# Patient Record
Sex: MALE | Race: WHITE | Employment: OTHER | URBAN - METROPOLITAN AREA
[De-identification: names, ages, dates, MRNs, and addresses within clinical notes are randomized per-mention and may not be internally consistent; named-entity substitution may affect disease eponyms.]

---

## 2020-01-28 ENCOUNTER — TELEPHONE (OUTPATIENT)
Dept: TRANSPLANT | Facility: CLINIC | Age: 60
End: 2020-01-28

## 2020-01-28 ENCOUNTER — MEDICAL CORRESPONDENCE (OUTPATIENT)
Dept: HEALTH INFORMATION MANAGEMENT | Facility: CLINIC | Age: 60
End: 2020-01-28

## 2020-01-28 NOTE — TELEPHONE ENCOUNTER
"[facility] BREEZE  [id number]      LIVING KIDNEY DONOR EVALUATION  Donor First Name Gerardo Donor MRALEXANDR    Donor Last Name Ashish Completed 2020 9:11 PM    1960 Record ID [id number]   BREEZE Screen PASSED     Intended Recipient  Recipient First Name Peng Recipient MRALEXANDR    Recipient Last Name Prince Relationship Cousin   Recipient  1969 Recipient Diagnosis    Recipient's ABO      Donor Information  Age 59 Gender Male   Ht 173 cm (5' 8'') Race    Wt 81.6 kg (180 lbs) Ethnicity Not /   BMI 27.40 kg/m  Preferred Language English      Required No     Blood Type O   Demographics  Home Address [address] [place] [address] # [phone]   [facility] Type [place]   State [facility] #    Zip Code [address] Type    Country [place] Preferred Contact day Mon, Fri, Thur, Wed, Tue   Email [email or web address] Preferred Contact time 11:00 AM-1:00 PM, 09:00 AM-11:00 AM   &&   Donor's Medical Information  Medical History Allergic Rhinitis   Erectile Dysfunction   Stress Tests, Normal    other (\"occasionally when having chest cold\") Medications Albuterol Inhaler   Fluticasone Nasal Spray   Sildenafil   Tadalafil   Surgical History None Reported Allergies NKDA   Social History EtOH: Occasional (1-2 drinks/week)   Illicit Drug Use: Denies   Tobacco: Denies Self-Reported Functional Status \"I am able to participate in strenuous sports such as swimming, singles tennis, football, basketball, or skiing\"   Family Medical History Cancer (denies)   Diabetes (denies)   Heart Disease (denies)   Hypertension (denies)   Kidney Disease (denies)   Kidney Stones (denies) Exercise Frequency Exercise (>3 times per week)   Review of Organ Systems  Review of Systems Airway or Lungs: No   Blood Disorder: No   Cancer: No   Diabetes,Thyroid,Adrenal,Endocrine Disorder: No   Digestive or Liver: No   Heart or Circulatory System: No   Immune Diseases: No   Kidneys and Bladder: No   Male Health: " Yes   Muscles,Bones,Joints: No   Neuro: No   Psych: No   &&   Donor's Social Information  Marital Status  Living Accommodation Owns own home/apartment   Level of Education Graduate or professional degree complete Living Arrangement With spouse   Employment Status Unemployed Concerns: health and life insurance No   Employer  Concerns: job security and lost income No   Occupation      Medical Insurance Status Has medical insurance     High Risk Behavior  High Risk Behaviors Blood transfusion < 12 months. (NO)   Commercial sex < 12 months. (NO)   Illicit IV drug use < 5yrs. (NO)   Male:male sexual contact < 5yrs. (NO)   Other high risk sexual contact < 12 months. (NO)   Reason for Donation  Referral Tx Candidate Reason for Donation Family member needs help   Permission to Disclose Inquiry Yes Patient Comments    Donor Motivation Level Highly motivated donor     PCP Contact  PCP Name [facility] Dr. Sczublewski   PCP [facility]   PCP Phone [phone]   Emergency Contact  First Name Alisia First Name    Last Name Ashish Last Name    Phone # [phone] Phone #    Phone Type Mobile Phone Type    Relationship Spouse Relationship    Office Use  Reviewed By    Reviewed 1/28/2020 11:40 AM   Admin Folder Archive   Comments    Lost for Followup    Extended Comments    BREEZE ID [email or web address]:[id number] survey status completed   Activity History  Call  Task    Due Date 1/27/2020   Last Modified Date/Time 1/27/2020 10:35 AM   Comments

## 2020-01-30 DIAGNOSIS — Z00.5 TRANSPLANT DONOR EVALUATION: Primary | ICD-10-CM

## 2020-02-05 ENCOUNTER — ALLIED HEALTH/NURSE VISIT (OUTPATIENT)
Dept: NURSING | Facility: CLINIC | Age: 60
End: 2020-02-05
Payer: COMMERCIAL

## 2020-02-05 VITALS
SYSTOLIC BLOOD PRESSURE: 116 MMHG | BODY MASS INDEX: 27.93 KG/M2 | WEIGHT: 184.3 LBS | DIASTOLIC BLOOD PRESSURE: 80 MMHG | HEIGHT: 68 IN

## 2020-02-05 DIAGNOSIS — Z00.5 TRANSPLANT DONOR EVALUATION: ICD-10-CM

## 2020-02-05 DIAGNOSIS — Z52.4 DONOR OF KIDNEY FOR TRANSPLANT: Primary | ICD-10-CM

## 2020-02-05 LAB
ABO + RH BLD: NORMAL
ABO + RH BLD: NORMAL
ALBUMIN UR-MCNC: NEGATIVE MG/DL
APPEARANCE UR: CLEAR
BILIRUB UR QL STRIP: NEGATIVE
COLOR UR AUTO: YELLOW
CREAT SERPL-MCNC: 1.45 MG/DL (ref 0.66–1.25)
CREAT UR-MCNC: 213 MG/DL
GFR SERPL CREATININE-BSD FRML MDRD: 53 ML/MIN/{1.73_M2}
GLUCOSE SERPL-MCNC: 96 MG/DL (ref 70–99)
GLUCOSE UR STRIP-MCNC: NEGATIVE MG/DL
HGB BLD-MCNC: 14.8 G/DL (ref 13.3–17.7)
HGB UR QL STRIP: NEGATIVE
KETONES UR STRIP-MCNC: NEGATIVE MG/DL
LEUKOCYTE ESTERASE UR QL STRIP: NEGATIVE
MICROALBUMIN UR-MCNC: 7 MG/L
MICROALBUMIN/CREAT UR: 3.09 MG/G CR (ref 0–17)
NITRATE UR QL: NEGATIVE
PH UR STRIP: 7 PH (ref 5–7)
PROT UR-MCNC: 0.15 G/L
PROT/CREAT 24H UR: 0.07 G/G CR (ref 0–0.2)
SOURCE: NORMAL
SP GR UR STRIP: 1.02 (ref 1–1.03)
SPECIMEN EXP DATE BLD: NORMAL
UROBILINOGEN UR STRIP-ACNC: 0.2 EU/DL (ref 0.2–1)

## 2020-02-05 PROCEDURE — 81003 URINALYSIS AUTO W/O SCOPE: CPT | Performed by: TRANSPLANT SURGERY

## 2020-02-05 PROCEDURE — 36415 COLL VENOUS BLD VENIPUNCTURE: CPT | Performed by: TRANSPLANT SURGERY

## 2020-02-05 PROCEDURE — 82565 ASSAY OF CREATININE: CPT | Performed by: TRANSPLANT SURGERY

## 2020-02-05 PROCEDURE — 82043 UR ALBUMIN QUANTITATIVE: CPT | Performed by: TRANSPLANT SURGERY

## 2020-02-05 PROCEDURE — 86900 BLOOD TYPING SEROLOGIC ABO: CPT | Performed by: TRANSPLANT SURGERY

## 2020-02-05 PROCEDURE — 82947 ASSAY GLUCOSE BLOOD QUANT: CPT | Performed by: TRANSPLANT SURGERY

## 2020-02-05 PROCEDURE — 84156 ASSAY OF PROTEIN URINE: CPT | Performed by: TRANSPLANT SURGERY

## 2020-02-05 PROCEDURE — 85018 HEMOGLOBIN: CPT | Performed by: TRANSPLANT SURGERY

## 2020-02-05 PROCEDURE — 99207 ZZC NO CHARGE NURSE ONLY: CPT

## 2020-02-05 ASSESSMENT — MIFFLIN-ST. JEOR: SCORE: 1635.48

## 2020-02-05 NOTE — NURSING NOTE
I recorded a height, weight and three blood pressures 15 minutes apart.  I validated with the patient they have already had their lab appointment, have one today or one in the future.     Antonia Saucedo RN

## 2020-02-07 ENCOUNTER — TELEPHONE (OUTPATIENT)
Dept: TRANSPLANT | Facility: CLINIC | Age: 60
End: 2020-02-07

## 2020-02-07 DIAGNOSIS — Z00.5 TRANSPLANT DONOR EVALUATION: Primary | ICD-10-CM

## 2020-02-07 NOTE — TELEPHONE ENCOUNTER
Informed Gerardo of lab results. Cr=1.45--est GFR=53--average GFR=59. Admitted to taking body building supplements.Will hold off taking them for 3 weeks and will repeat Cr.

## 2020-03-03 DIAGNOSIS — Z00.5 TRANSPLANT DONOR EVALUATION: ICD-10-CM

## 2020-03-03 LAB
CREAT SERPL-MCNC: 1.36 MG/DL (ref 0.66–1.25)
GFR SERPL CREATININE-BSD FRML MDRD: 57 ML/MIN/{1.73_M2}

## 2020-03-03 PROCEDURE — 82565 ASSAY OF CREATININE: CPT | Performed by: TRANSPLANT SURGERY

## 2020-03-03 PROCEDURE — 36415 COLL VENOUS BLD VENIPUNCTURE: CPT | Performed by: TRANSPLANT SURGERY

## 2022-09-01 ENCOUNTER — TELEPHONE (OUTPATIENT)
Dept: TRANSPLANT | Facility: CLINIC | Age: 62
End: 2022-09-01

## 2022-09-01 NOTE — TELEPHONE ENCOUNTER
Writer calling Gerardo to see if he is interested in being worked up as a living donor. Gerardo would like to proceed. Let him know we would get him set up with an JOHN call then referral call and most likely phase 1 testing. Gerardo verbalized understanding and is in agreement with plan. Gerardo states reaching out by email to schedule the calls would be best.

## 2022-09-08 ENCOUNTER — TELEPHONE (OUTPATIENT)
Dept: TRANSPLANT | Facility: CLINIC | Age: 62
End: 2022-09-08

## 2022-09-09 ENCOUNTER — DOCUMENTATION ONLY (OUTPATIENT)
Dept: TRANSPLANT | Facility: CLINIC | Age: 62
End: 2022-09-09

## 2022-09-09 ENCOUNTER — TELEPHONE (OUTPATIENT)
Dept: TRANSPLANT | Facility: CLINIC | Age: 62
End: 2022-09-09

## 2022-09-09 NOTE — TELEPHONE ENCOUNTER
Initial Independent Living Donor Advocate contact made with potential donor today.  I introduced myself and my role during the donation process, includin.  JOHN ROLE   The federal government requires that all licensed transplant centers provide the living donor with an Independent Living Donor Advocate (JOHN).  I do not meet recipients or attend meetings that discuss their care or decision to transplant them. My role is separate to avoid any conflict of interest.  My role is to ensure:  1) your rights are protected;  2) you get all the information you need from the transplant team to make a fully informed decision whether to donate;   3) that living donation is in your best interest.   4) that you have the right to decide NOT to go forward with living donation at any time during this process.  I am available to you throughout the workup, during surgery phase and follow-up at home.   2. WORKUP & PRIVACY     Your identity and workup are not shared with the recipient at any time.     There is a medical donor workup that consists of testing to determine if you are healthy enough to donate.  Workup tests include many blood draws, urine collection/ (kidney function testing), chest x-ray, EKG, CT scan. As you complete each step then you may move on to the next.  Workup can take as little or as long as you need and you can stop the process at any time.     Transplant is a treatment option, not a cure. A kidney from a living kidney donor can last 12-14 years.  Other treatment options are  donation and two types of dialysis.     This is major surgery and your estimated hospital stay is approximately 1-2 nights.  After surgery, there are driving and lifting restrictions - no driving for two weeks and no lifting over ten pounds for 8 weeks.  Donors are routinely off from work for 4 - 6 weeks after surgery, and potentially longer if they have a physical job.       If you anticipate lost wages due to donation, donor  wage reimbursement options may be available to you and will be reviewed with you during the evaluation process.      The recipient's insurance covers the medical expenses related to the donor evaluation and surgery.  However, it is important for you to carry your own health insurance to address any medical issues that are found and are NOT related to living donation.  3.  QUESTIONS  Have you received a packet from the transplant department?     Questions?    Have you discussed with anyone your potential decision to donate?   Yes.  Is anyone pressuring or coercing you to donate? No.  Have you discussed any financial arrangements with recipient around donating a kidney? Yes.  Are you aware that you can confidentially opt out at any time, up to and including day of donation? Yes.  At this time, would you like to proceed with the medical evaluation to see if you can be a kidney donor?  Yes.    If yes, the donor coordinator will be reaching out to you with next steps.     You can reach me or someone else on the JOHN team by calling [phone] Option 3.    JOHN NOTES: Gerardo wants to donate to his cousin.  He is retired.  He has two teenagers at home and one adult son who lives in a group home.  One of his daughters was in a serious car accident, and she is still doing a lot of therapy.  He would like to donate in January 2023.  Shelley Shahid RN is his donor nurse coordinator, and he is scheduled to talk to her on Tuesday, Sept. 13 at 1:00 PM (this is outside of your blocked times, since Gerardo is booked every morning taking his daughter to her physical therapy appointments.)    Duration of call 30 minutes

## 2022-09-13 ENCOUNTER — TELEPHONE (OUTPATIENT)
Dept: TRANSPLANT | Facility: CLINIC | Age: 62
End: 2022-09-13

## 2022-09-13 NOTE — TELEPHONE ENCOUNTER
Contacted Gerardo Hinojosa to introduce myself and my role, review of medical/surgical/family history and next steps.     Gerardo Hinojosa  is aware He can stop donor evaluation at any time.    Have you ever been positive for COVID 19? no    Have you received the COVID vaccination series? Yes     Reviewed risk of COVID-19 to living donors.    Regular blood donor? Yes Last blood donation date two years ago (Notified donor to avoid blood donation at this time to get accurate blood counts if going through evaluation)     Gerardo Hinojosa is a 62 year old male  ABO O that would like to learn more about kidney donation to his cousin.     Concerns from medical/surgical/family history: Not currently on any daily medications or diagnosis. Colonoscopy is due now.     Current medications and NSAID use: None    Reviewed any history of travel in endemic areas: [place], [place], [place]  Strongyloides- [place], [place] and [place].  Trypanosoma cruzi ([place])- [place]  West Nile Virus- [place], [place], [place], [place] and [place].     Per our Phase 1 algorithm, does meet criteria to do preliminary testing.     Reviewed evaluation testing: Iohexol, Lab work, CXR, EKG, Provider visits and functions, CT Angiogram.     Reviewed operations of selection committee and angio review meetings and the need for multidisciplinary input.     Reviewed NKR listing and transfer of care to D team if approved. Provided Gerardo with NKR website to review.     Briefly went over options if approved of NDD and voucher donation.     Gerardo would like to proceed with next steps: phase 1 testing     Confirmed that Gerardo reviewed Informed consent document and all questions answered.  Reviewed that they will receive Docusign to obtain electronic signature for the following: Informed consent, SRTR data, FLORENCE for medical information, Auth for Electronic communication and will need their signed consent back before proceeding with evaluation.       Encouraged sign up for MyChart and reviewed importance of watching teaching videos prior to evaluation.     Verified recipient status if not NDD.    Donor timeline: TBD     Will send orders to scheduling team to set up for phase 1 testing.

## 2022-09-14 ENCOUNTER — TRANSFERRED RECORDS (OUTPATIENT)
Dept: HEALTH INFORMATION MANAGEMENT | Facility: CLINIC | Age: 62
End: 2022-09-14

## 2022-09-14 ENCOUNTER — DOCUMENTATION ONLY (OUTPATIENT)
Dept: TRANSPLANT | Facility: CLINIC | Age: 62
End: 2022-09-14

## 2022-09-14 ENCOUNTER — MEDICAL CORRESPONDENCE (OUTPATIENT)
Dept: HEALTH INFORMATION MANAGEMENT | Facility: CLINIC | Age: 62
End: 2022-09-14

## 2022-09-14 DIAGNOSIS — Z00.5 TRANSPLANT DONOR EVALUATION: Primary | ICD-10-CM

## 2022-09-14 NOTE — PROGRESS NOTES
Sent via Email and US Mail orders for Phase 1's with instructions on scheduling appts at local clinic and fasting for labs. To contact me if ?'s.

## 2022-09-27 ENCOUNTER — TRANSFERRED RECORDS (OUTPATIENT)
Dept: HEALTH INFORMATION MANAGEMENT | Facility: CLINIC | Age: 62
End: 2022-09-27

## 2022-10-04 ENCOUNTER — TRANSFERRED RECORDS (OUTPATIENT)
Dept: HEALTH INFORMATION MANAGEMENT | Facility: CLINIC | Age: 62
End: 2022-10-04

## 2022-10-13 ENCOUNTER — TELEPHONE (OUTPATIENT)
Dept: TRANSPLANT | Facility: CLINIC | Age: 62
End: 2022-10-13

## 2022-10-13 DIAGNOSIS — Z52.4 KIDNEY DONOR: ICD-10-CM

## 2022-10-13 DIAGNOSIS — Z00.5 EXAMINATION OF POTENTIAL DONOR OF ORGAN AND TISSUE: ICD-10-CM

## 2022-10-13 NOTE — TELEPHONE ENCOUNTER
Let Gerardo know we would review the labs he had done last week. Per Dr Childress since creatinine was elevated we will have patient go for cystatin C draw. If that is normal he can come for eval.Asked Gerardo to let me know when he goes for the lab draw.

## 2022-10-13 NOTE — LETTER
PHYSICIAN ORDERS      DATE & TIME ISSUED: 2022 2:41 PM  PATIENT NAME: Gerardo Hinojosa   : 1960     [facility] MR# [if applicable]: [id number]     DIAGNOSIS:  Living donor evaluation  ICD-10 CODE: z00.5    Order for lab draw: Cystatin C with GFR    Any questions please call: Shelley at [phone]    Please fax these results to [phone]      Juan Childress MD

## 2022-10-14 ENCOUNTER — TRANSFERRED RECORDS (OUTPATIENT)
Dept: HEALTH INFORMATION MANAGEMENT | Facility: CLINIC | Age: 62
End: 2022-10-14

## 2022-10-25 DIAGNOSIS — Z00.5 TRANSPLANT DONOR EVALUATION: Primary | ICD-10-CM

## 2022-10-25 RX ORDER — EPINEPHRINE 1 MG/ML
0.3 INJECTION, SOLUTION, CONCENTRATE INTRAVENOUS EVERY 5 MIN PRN
Status: CANCELLED | OUTPATIENT
Start: 2022-11-04

## 2022-10-25 RX ORDER — DIPHENHYDRAMINE HYDROCHLORIDE 50 MG/ML
50 INJECTION INTRAMUSCULAR; INTRAVENOUS
Status: CANCELLED
Start: 2022-11-04

## 2022-10-25 RX ORDER — METHYLPREDNISOLONE SODIUM SUCCINATE 125 MG/2ML
125 INJECTION, POWDER, LYOPHILIZED, FOR SOLUTION INTRAMUSCULAR; INTRAVENOUS
Status: CANCELLED
Start: 2022-11-04

## 2022-10-25 RX ORDER — ALBUTEROL SULFATE 90 UG/1
1-2 AEROSOL, METERED RESPIRATORY (INHALATION)
Status: CANCELLED
Start: 2022-11-04

## 2022-10-25 RX ORDER — ALBUTEROL SULFATE 0.83 MG/ML
2.5 SOLUTION RESPIRATORY (INHALATION)
Status: CANCELLED | OUTPATIENT
Start: 2022-11-04

## 2022-10-25 RX ORDER — MEPERIDINE HYDROCHLORIDE 25 MG/ML
25 INJECTION INTRAMUSCULAR; INTRAVENOUS; SUBCUTANEOUS EVERY 30 MIN PRN
Status: CANCELLED | OUTPATIENT
Start: 2022-11-04

## 2022-11-03 LAB
ABO/RH(D): NORMAL
ANTIBODY SCREEN: NEGATIVE
SPECIMEN EXPIRATION DATE: NORMAL

## 2022-11-04 ENCOUNTER — ALLIED HEALTH/NURSE VISIT (OUTPATIENT)
Dept: TRANSPLANT | Facility: CLINIC | Age: 62
End: 2022-11-04
Attending: INTERNAL MEDICINE

## 2022-11-04 ENCOUNTER — DOCUMENTATION ONLY (OUTPATIENT)
Dept: TRANSPLANT | Facility: CLINIC | Age: 62
End: 2022-11-04

## 2022-11-04 ENCOUNTER — OFFICE VISIT (OUTPATIENT)
Dept: TRANSPLANT | Facility: CLINIC | Age: 62
End: 2022-11-04
Attending: INTERNAL MEDICINE

## 2022-11-04 ENCOUNTER — LAB (OUTPATIENT)
Dept: LAB | Facility: CLINIC | Age: 62
End: 2022-11-04
Attending: INTERNAL MEDICINE

## 2022-11-04 ENCOUNTER — OFFICE VISIT (OUTPATIENT)
Dept: NEPHROLOGY | Facility: CLINIC | Age: 62
End: 2022-11-04
Attending: INTERNAL MEDICINE

## 2022-11-04 ENCOUNTER — OFFICE VISIT (OUTPATIENT)
Dept: INFUSION THERAPY | Facility: CLINIC | Age: 62
End: 2022-11-04
Attending: INTERNAL MEDICINE

## 2022-11-04 VITALS
WEIGHT: 185 LBS | HEIGHT: 68 IN | SYSTOLIC BLOOD PRESSURE: 139 MMHG | DIASTOLIC BLOOD PRESSURE: 92 MMHG | OXYGEN SATURATION: 96 % | HEART RATE: 65 BPM | BODY MASS INDEX: 28.04 KG/M2

## 2022-11-04 VITALS
BODY MASS INDEX: 28.48 KG/M2 | TEMPERATURE: 97.4 F | DIASTOLIC BLOOD PRESSURE: 95 MMHG | HEART RATE: 60 BPM | OXYGEN SATURATION: 99 % | SYSTOLIC BLOOD PRESSURE: 145 MMHG | WEIGHT: 187.3 LBS | RESPIRATION RATE: 16 BRPM

## 2022-11-04 DIAGNOSIS — E78.2 MIXED HYPERLIPIDEMIA: ICD-10-CM

## 2022-11-04 DIAGNOSIS — Z00.5 TRANSPLANT DONOR EVALUATION: ICD-10-CM

## 2022-11-04 DIAGNOSIS — Z00.5 TRANSPLANT DONOR EVALUATION: Primary | ICD-10-CM

## 2022-11-04 DIAGNOSIS — R03.0 ELEVATED BLOOD PRESSURE READING: ICD-10-CM

## 2022-11-04 DIAGNOSIS — Z52.4 KIDNEY DONOR: ICD-10-CM

## 2022-11-04 DIAGNOSIS — J45.20 MILD INTERMITTENT ASTHMA WITHOUT COMPLICATION: ICD-10-CM

## 2022-11-04 DIAGNOSIS — Z52.4 KIDNEY DONOR: Primary | ICD-10-CM

## 2022-11-04 DIAGNOSIS — C44.41 BASAL CELL CARCINOMA (BCC) OF SCALP: ICD-10-CM

## 2022-11-04 PROBLEM — C44.91 BASAL CELL CARCINOMA: Status: ACTIVE | Noted: 2022-11-04

## 2022-11-04 LAB
ABO/RH(D): NORMAL
ALBUMIN MFR UR ELPH: <6 MG/DL
ALBUMIN SERPL BCG-MCNC: 4.5 G/DL (ref 3.5–5.2)
ALBUMIN UR-MCNC: NEGATIVE MG/DL
ALP SERPL-CCNC: 56 U/L (ref 40–129)
ALT SERPL W P-5'-P-CCNC: 19 U/L (ref 10–50)
ANION GAP SERPL CALCULATED.3IONS-SCNC: 9 MMOL/L (ref 7–15)
APPEARANCE UR: CLEAR
APTT PPP: 29 SECONDS (ref 22–38)
AST SERPL W P-5'-P-CCNC: 27 U/L (ref 10–50)
BILIRUB SERPL-MCNC: 0.5 MG/DL
BILIRUB UR QL STRIP: NEGATIVE
BUN SERPL-MCNC: 16.3 MG/DL (ref 8–23)
CALCIUM SERPL-MCNC: 9.7 MG/DL (ref 8.8–10.2)
CHLORIDE SERPL-SCNC: 103 MMOL/L (ref 98–107)
CHOLEST SERPL-MCNC: 185 MG/DL
CMV IGG SERPL IA-ACNC: <0.2 U/ML
CMV IGG SERPL IA-ACNC: NORMAL
COLOR UR AUTO: YELLOW
CREAT SERPL-MCNC: 1.19 MG/DL (ref 0.67–1.17)
CREAT UR-MCNC: 55.9 MG/DL
CREAT UR-MCNC: 56 MG/DL
DEPRECATED HCO3 PLAS-SCNC: 27 MMOL/L (ref 22–29)
EBV VCA IGG SER IA-ACNC: 184 U/ML
EBV VCA IGG SER IA-ACNC: POSITIVE
EBV VCA IGM SER IA-ACNC: <10 U/ML
EBV VCA IGM SER IA-ACNC: NORMAL
ERYTHROCYTE [DISTWIDTH] IN BLOOD BY AUTOMATED COUNT: 12.9 % (ref 10–15)
GFR SERPL CREATININE-BSD FRML MDRD: 70 ML/MIN/1.73M2
GLUCOSE SERPL-MCNC: 99 MG/DL (ref 70–99)
GLUCOSE UR STRIP-MCNC: NEGATIVE MG/DL
HBA1C MFR BLD: 5.6 %
HBV CORE AB SERPL QL IA: NONREACTIVE
HBV SURFACE AB SERPL IA-ACNC: 24.5 M[IU]/ML
HBV SURFACE AB SERPL IA-ACNC: REACTIVE M[IU]/ML
HBV SURFACE AG SERPL QL IA: NONREACTIVE
HCT VFR BLD AUTO: 45.6 % (ref 40–53)
HCV AB SERPL QL IA: NONREACTIVE
HDLC SERPL-MCNC: 57 MG/DL
HGB BLD-MCNC: 15.3 G/DL (ref 13.3–17.7)
HGB UR QL STRIP: NEGATIVE
HIV 1+2 AB+HIV1 P24 AG SERPL QL IA: NONREACTIVE
INR PPP: 0.99 (ref 0.85–1.15)
KETONES UR STRIP-MCNC: NEGATIVE MG/DL
LDLC SERPL CALC-MCNC: 94 MG/DL
LEUKOCYTE ESTERASE UR QL STRIP: NEGATIVE
MCH RBC QN AUTO: 29.5 PG (ref 26.5–33)
MCHC RBC AUTO-ENTMCNC: 33.6 G/DL (ref 31.5–36.5)
MCV RBC AUTO: 88 FL (ref 78–100)
MICROALBUMIN UR-MCNC: <12 MG/L
MICROALBUMIN/CREAT UR: NORMAL MG/G{CREAT}
NITRATE UR QL: NEGATIVE
NONHDLC SERPL-MCNC: 128 MG/DL
PH UR STRIP: 6.5 [PH] (ref 5–7)
PHOSPHATE SERPL-MCNC: 3.2 MG/DL (ref 2.5–4.5)
PLATELET # BLD AUTO: 213 10E3/UL (ref 150–450)
POTASSIUM SERPL-SCNC: 3.8 MMOL/L (ref 3.4–5.3)
PROT SERPL-MCNC: 7 G/DL (ref 6.4–8.3)
PROT/CREAT 24H UR: NORMAL MG/G{CREAT}
PSA SERPL-MCNC: 0.88 NG/ML (ref 0–4.5)
RBC # BLD AUTO: 5.18 10E6/UL (ref 4.4–5.9)
RBC URINE: 1 /HPF
SODIUM SERPL-SCNC: 139 MMOL/L (ref 136–145)
SP GR UR STRIP: 1.01 (ref 1–1.03)
SPECIMEN EXPIRATION DATE: NORMAL
T PALLIDUM AB SER QL: NONREACTIVE
TRIGL SERPL-MCNC: 168 MG/DL
URATE SERPL-MCNC: 5.8 MG/DL (ref 3.4–7)
UROBILINOGEN UR STRIP-MCNC: NORMAL MG/DL
WBC # BLD AUTO: 5.9 10E3/UL (ref 4–11)
WBC URINE: <1 /HPF

## 2022-11-04 PROCEDURE — 83036 HEMOGLOBIN GLYCOSYLATED A1C: CPT

## 2022-11-04 PROCEDURE — 36415 COLL VENOUS BLD VENIPUNCTURE: CPT | Performed by: SURGERY

## 2022-11-04 PROCEDURE — 86780 TREPONEMA PALLIDUM: CPT

## 2022-11-04 PROCEDURE — 86901 BLOOD TYPING SEROLOGIC RH(D): CPT

## 2022-11-04 PROCEDURE — 86665 EPSTEIN-BARR CAPSID VCA: CPT

## 2022-11-04 PROCEDURE — 99207 PR NO CHARGE COORDINATED CARE PS: CPT

## 2022-11-04 PROCEDURE — 36415 COLL VENOUS BLD VENIPUNCTURE: CPT

## 2022-11-04 PROCEDURE — 86481 TB AG RESPONSE T-CELL SUSP: CPT

## 2022-11-04 PROCEDURE — 86706 HEP B SURFACE ANTIBODY: CPT

## 2022-11-04 PROCEDURE — 86704 HEP B CORE ANTIBODY TOTAL: CPT

## 2022-11-04 PROCEDURE — 99205 OFFICE O/P NEW HI 60 MIN: CPT | Performed by: SURGERY

## 2022-11-04 PROCEDURE — 82542 COL CHROMOTOGRAPHY QUAL/QUAN: CPT | Performed by: SURGERY

## 2022-11-04 PROCEDURE — G0103 PSA SCREENING: HCPCS

## 2022-11-04 PROCEDURE — 84100 ASSAY OF PHOSPHORUS: CPT

## 2022-11-04 PROCEDURE — 86850 RBC ANTIBODY SCREEN: CPT

## 2022-11-04 PROCEDURE — 84550 ASSAY OF BLOOD/URIC ACID: CPT

## 2022-11-04 PROCEDURE — 84156 ASSAY OF PROTEIN URINE: CPT

## 2022-11-04 PROCEDURE — 81378 HLA I & II TYPING HR: CPT

## 2022-11-04 PROCEDURE — 82043 UR ALBUMIN QUANTITATIVE: CPT

## 2022-11-04 PROCEDURE — 85027 COMPLETE CBC AUTOMATED: CPT

## 2022-11-04 PROCEDURE — 80061 LIPID PANEL: CPT

## 2022-11-04 PROCEDURE — 85610 PROTHROMBIN TIME: CPT

## 2022-11-04 PROCEDURE — 80053 COMPREHEN METABOLIC PANEL: CPT

## 2022-11-04 PROCEDURE — 87340 HEPATITIS B SURFACE AG IA: CPT

## 2022-11-04 PROCEDURE — 85730 THROMBOPLASTIN TIME PARTIAL: CPT

## 2022-11-04 PROCEDURE — 86789 WEST NILE VIRUS ANTIBODY: CPT

## 2022-11-04 PROCEDURE — 86803 HEPATITIS C AB TEST: CPT

## 2022-11-04 PROCEDURE — 81001 URINALYSIS AUTO W/SCOPE: CPT

## 2022-11-04 PROCEDURE — 99205 OFFICE O/P NEW HI 60 MIN: CPT | Mod: GC | Performed by: INTERNAL MEDICINE

## 2022-11-04 PROCEDURE — 86644 CMV ANTIBODY: CPT

## 2022-11-04 PROCEDURE — 255N000002 HC RX 255 OP 636: Performed by: INTERNAL MEDICINE

## 2022-11-04 RX ORDER — DIPHENHYDRAMINE HYDROCHLORIDE 50 MG/ML
50 INJECTION INTRAMUSCULAR; INTRAVENOUS
Status: CANCELLED
Start: 2022-11-04

## 2022-11-04 RX ORDER — SILDENAFIL 100 MG/1
TABLET, FILM COATED ORAL
COMMUNITY
Start: 2022-01-03

## 2022-11-04 RX ORDER — ALBUTEROL SULFATE 90 UG/1
1-2 AEROSOL, METERED RESPIRATORY (INHALATION)
COMMUNITY
Start: 2022-10-04

## 2022-11-04 RX ORDER — ALBUTEROL SULFATE 0.83 MG/ML
2.5 SOLUTION RESPIRATORY (INHALATION)
Status: CANCELLED | OUTPATIENT
Start: 2022-11-04

## 2022-11-04 RX ORDER — METHYLPREDNISOLONE SODIUM SUCCINATE 125 MG/2ML
125 INJECTION, POWDER, LYOPHILIZED, FOR SOLUTION INTRAMUSCULAR; INTRAVENOUS
Status: CANCELLED
Start: 2022-11-04

## 2022-11-04 RX ORDER — EPINEPHRINE 1 MG/ML
0.3 INJECTION, SOLUTION INTRAMUSCULAR; SUBCUTANEOUS EVERY 5 MIN PRN
Status: CANCELLED | OUTPATIENT
Start: 2022-11-04

## 2022-11-04 RX ORDER — TADALAFIL 20 MG/1
TABLET ORAL
COMMUNITY
Start: 2022-01-03

## 2022-11-04 RX ORDER — ROSUVASTATIN CALCIUM 10 MG/1
10 TABLET, COATED ORAL DAILY
COMMUNITY
Start: 2022-10-16 | End: 2023-10-16

## 2022-11-04 RX ORDER — MEPERIDINE HYDROCHLORIDE 25 MG/ML
25 INJECTION INTRAMUSCULAR; INTRAVENOUS; SUBCUTANEOUS EVERY 30 MIN PRN
Status: CANCELLED | OUTPATIENT
Start: 2022-11-04

## 2022-11-04 RX ORDER — FLUTICASONE PROPIONATE 50 MCG
2 SPRAY, SUSPENSION (ML) NASAL
COMMUNITY
Start: 2022-10-04

## 2022-11-04 RX ORDER — FEXOFENADINE HCL AND PSEUDOEPHEDRINE HCI 60; 120 MG/1; MG/1
1 TABLET, EXTENDED RELEASE ORAL 2 TIMES DAILY
COMMUNITY
Start: 2022-10-04

## 2022-11-04 RX ORDER — ALBUTEROL SULFATE 90 UG/1
1-2 AEROSOL, METERED RESPIRATORY (INHALATION)
Status: CANCELLED
Start: 2022-11-04

## 2022-11-04 RX ADMIN — IOHEXOL 4 ML: 350 INJECTION, SOLUTION INTRAVENOUS at 08:39

## 2022-11-04 ASSESSMENT — PAIN SCALES - GENERAL: PAINLEVEL: NO PAIN (0)

## 2022-11-04 NOTE — PROGRESS NOTES
Saw Gerardo in clinic on 22 for Living Kidney Donor Evaluation.     Gerardo is interested in donation for a friend.    I provided a folder which included copies of the followin. Living Kidney Donor Evaluation Consent  2. Paired Exchange Consent  3. Donor Shield Pamphlet  4. Living Donor Collective Study information  5. Kidney for Life pamphlet  6. Kidney Donors are Heroes! Study synopsis  7. Most current SRTR data.      I also provided a parking pass and food voucher.      I reviewed the Living Kidney Donor Evaluation Consent, dated 2020 and Paired Exchange/ NDD consent dated 2018.  I answered any question.    Evaluation Notes:    We are cancelling the rest of his appointments today due to high blood pressures. He needs to follow up with PCP about Bps and lipids. Gerardo verbalized understanding and is in agreement with plan.

## 2022-11-04 NOTE — LETTER
11/4/2022       RE: Gerardo Hinojosa  [address] [facility] [address]     Dear Colleague,    Thank you for referring your patient, Gerardo Hinojosa, to the [facility] [place] at [facility]. Please see a copy of my visit note below.    TRANSPLANT NEPHROLOGY DONOR EVALUATION    Assessment and Plan:  # Prospective Kidney Transplant Donor: Patient with a couple of issues that need to be addressed prior to donation. Patient's blood pressure is elevated at this visit, kidney function appears to be acceptable with Iohexol pending, and urinalysis is bland.   -Recommend management of BP with PCP   -If plan is to move forward with family voucher donation would then recommend 24h ambulatory BP monitoring to assure BP is controlled, obtain echo and optho consult to look for end organ damage.    -Will need screening colonoscopy   -Derm clearance due to history of BCC and multiple AKs    # Hypertension:   - No prior history of blood pressure issues, but BP elevated to around 140-150s/90s in clinic today. Per patient, BP has also been slowly increasing in his more recent clinic visits.    -Recommend PCP managing BP and if patient still would like to donate would then recommend 24h ambulatory BP monitoring to assure BP is controlled, obtain echo and optho consult to look for end organ damage.       # Hyperlipidemia:    -Elevated ASCVD risk due to HLD and age. Recommend discussion with PCP regarding starting a statin    # History of BCC with multiple dysplastic nevi:   -derm clearance    # Cardiac risk:   -medium 2/2 age. Recommend echo    # Asthma:   -reviewed recent PFT results from outside records, showing mild obstructive findings and increased diffusion capacity   -has PRN albuterol inhaler at home, uses at most a few times per week    # Healthcare maintenance:   -colonoscopy: last at age 52 (per patient was normal), has a repeat scheduled in December  2022   -PSA was checked last month, was normal    Discussed the risks of donating a kidney, including the surgical risk and the possible risks of living with one kidney.    Education about expected post-donation kidney function and how chronic kidney disease (CKD) and end stage kidney disease (ESKD) might potentially impact the donor in the future, include, but not limited to:       - On average, donors will have 25-35% permanent loss of kidney function at donation.       - Baseline risk of ESKD may slightly exceed that of members of the general population with the same demographic profile.       - Donor risks must be interpreted in light of known epidemiology of both CKD or ESKD, such as that CKD generally develops in midlife (40-50 years old) and ESKD generally develops after age 60.       - Medical evaluation of young potential donors cannot predict lifetime risk of CKD or ESKD.       - Donors may be at higher risk for CKD if they sustain damage to the remaining kidney.       - Development of CKD and progression of ESKD may be more rapid with only 1 kidney.       - Some type of kidney replacement therapy, either kidney transplant or dialysis, is required when reaching ESKD.    Potential medical or surgical risks include, but not limited to:       - Death.       - Scars, pain, fatigue, and other consequences typical of any surgical procedure.       - Decreased kidney function.       - Abdominal or bowel symptoms, such as bloating and nausea, and developing bowel obstruction.       - Kidney failure (ESKD) and the need for a kidney transplant or dialysis for the donor.       - Impact of obesity, hypertension, or other donor-specific medical conditions on morbidity and mortality of the potential donor.    Patients overall evaluation will be discussed with the transplant team and a final recommendation on the patients' suitability to be a kidney transplant donor will be made at that time.    Discussed with   [place].    Luis Daniel Cole MD  PGY1, Internal Medicine  Transplant Nephrology Clinic    Physician Attestation   I, Juan Childress MD, personally examined and evaluated this patient.  I discussed the patient with the resident/fellow and care team, and agree with the assessment and plan of care as documented in the note on 11/04/22 .      I personally reviewed vital signs, medications, labs and imaging.  Juan Childress MD  Date of Service (when I saw the patient): 11/04/22          Consult:  Gerardo Hinojosa was seen in consultation at the request of Dr. Bladimir Jackson for evaluation as a potential kidney transplant donor.    Reason for Visit:   Gerardo Hinojosa is a 62 year old male who presents for a kidney donor evaluation.  Patient would like to donate to his cousin.    Present Condition and Donor-Related Medical History:    Mr. Hinojosa is a 62yM ABO O w/ PMH of BCC frontal scalp requiring Mohs surgery in 2012, dysplastic nevi for which he last saw dermatology 2/11/22, hyperlipidemia, elevated Bps presenting to be evaluated as a living donor for his cousin.     The patient overall feels well. He denies any recent hospitalizations. He denies nausea, vomiting, diarrhea, fever, chills, shortness of breath, chest pain, LE edema, unintentional weight loss, nights sweats, dysuria, hematuria.              Kidney Disease Hx:       h/o Kidney Problems: No  Family h/o Genetic Kidney Disease: No       h/o Hypertension: no history, but may be developing HTN    Usual Blood Pressure: 140s/90s       h/o Protein in Urine: No  h/o Blood in Urine: No       h/o Kidney Stones: No  h/o Kidney Injury: No       h/o Recurrent UTI: No  h/o Genitourinary Problems: No       h/o Chronic NSAID Use: No         Other Medical Hx:       h/o Diabetes: No             h/o Gastrointestinal, Pancreas or Liver Problems: No       h/o Lung or Heart Problems: No       h/o Hematologic Problems: No but father with history of some kind of blood dysplasia  h/o Bleeding or  Clotting Problems: No       h/o Cancer: No       h/o Infection Problems: No       h/o Gestational DM: Not applicable      h/o Preeclampsia: Not applicable         Skin Cancer Risk:       h/o more than 50 moles: No        h/o extensive sun exposure: per patient, about average       h/o melanoma: No, but had BCC s/p Mohs       Family h/o melanoma: No         Mental Health Assessment:       h/o Depression: No       h/o Psychiatric Illness: No       h/o Suicidal Attempt(s): No    COVID Status:  Vaccination Up To Date: No, due for next dose  H/o COVID Infection: No     Review Of Systems:   A comprehensive review of systems was obtained and negative, except as noted in the HPI or PMH.    Past Medical History:   History was taken from the patient as noted below.  Past Medical History:   Diagnosis Date     History of basal cell carcinoma     s/p Mohs surgery in        Past Social History:   No past surgical history on file.  Personal or family history of anesthesia problems: No    Family History:   Family History   Problem Relation Age of Onset     Breast Cancer Mother 80     Blood Disease Father 70        some kind of blood dysplasia, passed from old age in 90s     Diverticulitis Brother           Specific Family History in First Degree Relatives:       FH of Kidney Dz: No FH of Diabetes: No       FH of Hypertension: Yes   FH of CAD: No       FH of Cancer: No  FH of Kidney Cancer: No    Personal History:   Social History     Socioeconomic History     Marital status:      Spouse name: Not on file     Number of children: 3     Years of education: Not on file     Highest education level: Not on file   Occupational History     Not on file   Tobacco Use     Smoking status: Some Days     Types: Cigarettes, Cigars     Start date:      Last attempt to quit: 2020     Years since quittin.8     Smokeless tobacco: Never     Tobacco comments:     Smoked cigarettes socially since college, last in . Currently  smokes cigars about once a month.    Substance and Sexual Activity     Alcohol use: Yes     Alcohol/week: 5.0 standard drinks     Types: 5 Standard drinks or equivalent per week     Comment: about 5 drinks per week, mix of wine/beer/others     Drug use: Never     Sexual activity: Not on file   Other Topics Concern     Not on file   Social History Narrative    Lives at home with daughters and wife. Also has a son who is currently living in a group home. Runs for exercise, ran 10 miles in June, but has been running less. Amount of exercise fluctuates throughout the year.      Social Determinants of Health     Financial Resource Strain: Not on file   Food Insecurity: Not on file   Transportation Needs: Not on file   Physical Activity: Not on file   Stress: Not on file   Social Connections: Not on file   Intimate Partner Violence: Not on file   Housing Stability: Not on file          Specific Social History:       Health Insurance Status: Yes       Employment Status: Retired  Occupation:  for 30 years                       Living Arrangements: lives with their spouse and daughters       Social Support: Yes       Presence of increased risk for disease transmission behaviors as defined by PHS guidelines: No        Allergies:  No Known Allergies    Medications:  Current Outpatient Medications   Medication Sig     albuterol (PROAIR HFA/PROVENTIL HFA/VENTOLIN HFA) 108 (90 Base) MCG/ACT inhaler Inhale 1-2 puffs into the lungs     fexofenadine-pseudoePHEDrine (ALLEGRA-D)  MG 12 hr tablet Take 1 tablet by mouth 2 times daily     fluticasone (FLONASE) 50 MCG/ACT nasal spray 2 sprays     rosuvastatin (CRESTOR) 10 MG tablet Take 10 mg by mouth daily     sildenafil (VIAGRA) 100 MG tablet TAKE 1/2 TO 1 (ONE-HALF TO ONE) TABLET BY MOUTH ONCE DAILY AS NEEDED     tadalafil (CIALIS) 20 MG tablet TAKE 1 TABLET BY MOUTH AS NEEDED FOR ERECTILE DYSFUNCTION     No current facility-administered medications for this visit.  "    Facility-Administered Medications Ordered in Other Visits   Medication     sodium chloride (PF) 0.9% PF flush 5 mL     There are no discontinued medications.      Vitals:  Vital Signs 11/4/2022 11/4/2022 11/4/2022   Systolic 143 145 139   Diastolic 98 94 92   Pulse 65 - -   Temperature - - -   Respirations - - -   Weight (LB) 185 lb - -   Height 5' 8\" - -   BMI (Calculated) 28.13 - -   Pain Score - - -   O2 96 - -       Exam:   GENERAL APPEARANCE: alert and no distress  EYES: PERRL  HENT: mouth without ulcers or lesions  NECK: supple, no adenopathy  LYMPHATICS: no cervical or supraclavicular nodes  RESP: lungs clear to auscultation - no rales, rhonchi or wheezes  CV: regular rhythm, normal rate, no rub, no murmur  EDEMA: no LE edema bilaterally  ABDOMEN: soft, nondistended, nontender, bowel sounds normal  MS: extremities normal - no gross deformities noted, no evidence of inflammation in joints, no muscle tenderness  SKIN: no rash  NEURO: normal strength and tone, sensory exam grossly normal, mentation intact and speech normal  PSYCH: mentation appears normal and affect normal/bright    Results:   Labs and imaging were ordered for this visit and reviewed by me.  Recent Results (from the past 336 hour(s))   Protein  random urine    Collection Time: 11/04/22  8:14 AM   Result Value Ref Range    Total Protein Urine mg/dL <6.0 mg/dL    Total Protein UR MG/MG CR      Creatinine Urine mg/dL 56.0 mg/dL   Routine UA with microscopic    Collection Time: 11/04/22  8:14 AM   Result Value Ref Range    Color Urine Yellow Colorless, Straw, Light Yellow, Yellow    Appearance Urine Clear Clear    Glucose Urine Negative Negative mg/dL    Bilirubin Urine Negative Negative    Ketones Urine Negative Negative mg/dL    Specific Gravity Urine 1.015 1.003 - 1.035    Blood Urine Negative Negative    pH Urine 6.5 5.0 - 7.0    Protein Albumin Urine Negative Negative mg/dL    Urobilinogen Urine Normal Normal, 2.0 mg/dL    Nitrite Urine " Negative Negative    Leukocyte Esterase Urine Negative Negative    RBC Urine 1 <=2 /HPF    WBC Urine <1 <=5 /HPF   CBC with platelets    Collection Time: 11/04/22  8:14 AM   Result Value Ref Range    WBC Count 5.9 4.0 - 11.0 10e3/uL    RBC Count 5.18 4.40 - 5.90 10e6/uL    Hemoglobin 15.3 13.3 - 17.7 g/dL    Hematocrit 45.6 40.0 - 53.0 %    MCV 88 78 - 100 fL    MCH 29.5 26.5 - 33.0 pg    MCHC 33.6 31.5 - 36.5 g/dL    RDW 12.9 10.0 - 15.0 %    Platelet Count 213 150 - 450 10e3/uL   Partial thromboplastin time    Collection Time: 11/04/22  8:14 AM   Result Value Ref Range    aPTT 29 22 - 38 Seconds   INR    Collection Time: 11/04/22  8:14 AM   Result Value Ref Range    INR 0.99 0.85 - 1.15   Hemoglobin A1c    Collection Time: 11/04/22  8:14 AM   Result Value Ref Range    Hemoglobin A1C 5.6 <5.7 %   Phosphorus    Collection Time: 11/04/22  8:14 AM   Result Value Ref Range    Phosphorus 3.2 2.5 - 4.5 mg/dL   Comprehensive metabolic panel    Collection Time: 11/04/22  8:14 AM   Result Value Ref Range    Sodium 139 136 - 145 mmol/L    Potassium 3.8 3.4 - 5.3 mmol/L    Chloride 103 98 - 107 mmol/L    Carbon Dioxide (CO2) 27 22 - 29 mmol/L    Anion Gap 9 7 - 15 mmol/L    Urea Nitrogen 16.3 8.0 - 23.0 mg/dL    Creatinine 1.19 (H) 0.67 - 1.17 mg/dL    Calcium 9.7 8.8 - 10.2 mg/dL    Glucose 99 70 - 99 mg/dL    Alkaline Phosphatase 56 40 - 129 U/L    AST 27 10 - 50 U/L    ALT 19 10 - 50 U/L    Protein Total 7.0 6.4 - 8.3 g/dL    Albumin 4.5 3.5 - 5.2 g/dL    Bilirubin Total 0.5 <=1.2 mg/dL    GFR Estimate 70 >60 mL/min/1.73m2   Adult Type and Screen    Collection Time: 11/04/22  8:14 AM   Result Value Ref Range    ABO/RH(D) O POS     Antibody Screen Negative Negative    SPECIMEN EXPIRATION DATE [id number]    ABO and Rh 2nd type and screen required    Collection Time: 11/04/22  8:42 AM   Result Value Ref Range    ABO/RH(D) O POS     SPECIMEN EXPIRATION DATE [id number]        Again, thank you for allowing me to  participate in the care of your patient.      Sincerely,    Juan Childress MD

## 2022-11-04 NOTE — NURSING NOTE
Chief Complaint   Patient presents with     Infusion     IVP iohexol, labs     Blood Draw     Labs drawn with piv start by rn.  VS taken.     Labs drawn with PIV start by rn.  Pt tolerated well.  VS taken and pt checked in for next appt.    Amber Thompson RN

## 2022-11-04 NOTE — PROGRESS NOTES
TRANSPLANT NEPHROLOGY DONOR EVALUATION    Assessment and Plan:  # Prospective Kidney Transplant Donor: Patient with a couple of issues that need to be addressed prior to donation. Patient's blood pressure is elevated at this visit, kidney function appears to be acceptable with Iohexol pending, and urinalysis is bland.   -Recommend management of BP with PCP   -If plan is to move forward with family voucher donation would then recommend 24h ambulatory BP monitoring to assure BP is controlled, obtain echo and optho consult to look for end organ damage.    -Will need screening colonoscopy   -Derm clearance due to history of BCC and multiple AKs    # Hypertension:   - No prior history of blood pressure issues, but BP elevated to around 140-150s/90s in clinic today. Per patient, BP has also been slowly increasing in his more recent clinic visits.    -Recommend PCP managing BP and if patient still would like to donate would then recommend 24h ambulatory BP monitoring to assure BP is controlled, obtain echo and optho consult to look for end organ damage.       # Hyperlipidemia:    -Elevated ASCVD risk due to HLD and age. Recommend discussion with PCP regarding starting a statin    # History of BCC with multiple dysplastic nevi:   -derm clearance    # Cardiac risk:   -medium 2/2 age. Recommend echo    # Asthma:   -reviewed recent PFT results from outside records, showing mild obstructive findings and increased diffusion capacity   -has PRN albuterol inhaler at home, uses at most a few times per week    # Healthcare maintenance:   -colonoscopy: last at age 52 (per patient was normal), has a repeat scheduled in December 2022   -PSA was checked last month, was normal    Discussed the risks of donating a kidney, including the surgical risk and the possible risks of living with one kidney.    Education about expected post-donation kidney function and how chronic kidney disease (CKD) and end stage kidney disease (ESKD) might  potentially impact the donor in the future, include, but not limited to:       - On average, donors will have 25-35% permanent loss of kidney function at donation.       - Baseline risk of ESKD may slightly exceed that of members of the general population with the same demographic profile.       - Donor risks must be interpreted in light of known epidemiology of both CKD or ESKD, such as that CKD generally develops in midlife (40-50 years old) and ESKD generally develops after age 60.       - Medical evaluation of young potential donors cannot predict lifetime risk of CKD or ESKD.       - Donors may be at higher risk for CKD if they sustain damage to the remaining kidney.       - Development of CKD and progression of ESKD may be more rapid with only 1 kidney.       - Some type of kidney replacement therapy, either kidney transplant or dialysis, is required when reaching ESKD.    Potential medical or surgical risks include, but not limited to:       - Death.       - Scars, pain, fatigue, and other consequences typical of any surgical procedure.       - Decreased kidney function.       - Abdominal or bowel symptoms, such as bloating and nausea, and developing bowel obstruction.       - Kidney failure (ESKD) and the need for a kidney transplant or dialysis for the donor.       - Impact of obesity, hypertension, or other donor-specific medical conditions on morbidity and mortality of the potential donor.    Patients overall evaluation will be discussed with the transplant team and a final recommendation on the patients' suitability to be a kidney transplant donor will be made at that time.    Discussed with Dr. Childress.    Luis Daniel Cole MD  PGY1, Internal Medicine  [facility]    Physician Attestation   I, Juan Childress MD, personally examined and evaluated this patient.  I discussed the patient with the resident/fellow and care team, and agree with the assessment and plan of care as documented in the  note on 11/04/22 .      I personally reviewed vital signs, medications, labs and imaging.  Juan Childress MD  Date of Service (when I saw the patient): 11/04/22          Consult:  Gerardo Hinojosa was seen in consultation at the request of Dr. Bladimir Jackson for evaluation as a potential kidney transplant donor.    Reason for Visit:   Gerardo Hinojosa is a 62 year old male who presents for a kidney donor evaluation.  Patient would like to donate to his cousin.    Present Condition and Donor-Related Medical History:    Mr. Hinojosa is a 62yM ABO O w/ PMH of BCC frontal scalp requiring Mohs surgery in 2012, dysplastic nevi for which he last saw dermatology 2/11/22, hyperlipidemia, elevated Bps presenting to be evaluated as a living donor for his cousin.     The patient overall feels well. He denies any recent hospitalizations. He denies nausea, vomiting, diarrhea, fever, chills, shortness of breath, chest pain, LE edema, unintentional weight loss, nights sweats, dysuria, hematuria.              Kidney Disease Hx:       h/o Kidney Problems: No  Family h/o Genetic Kidney Disease: No       h/o Hypertension: no history, but may be developing HTN    Usual Blood Pressure: 140s/90s       h/o Protein in Urine: No  h/o Blood in Urine: No       h/o Kidney Stones: No  h/o Kidney Injury: No       h/o Recurrent UTI: No  h/o Genitourinary Problems: No       h/o Chronic NSAID Use: No         Other Medical Hx:       h/o Diabetes: No             h/o Gastrointestinal, Pancreas or Liver Problems: No       h/o Lung or Heart Problems: No       h/o Hematologic Problems: No but father with history of some kind of blood dysplasia  h/o Bleeding or Clotting Problems: No       h/o Cancer: No       h/o Infection Problems: No       h/o Gestational DM: Not applicable      h/o Preeclampsia: Not applicable         Skin Cancer Risk:       h/o more than 50 moles: No        h/o extensive sun exposure: per patient, about average       h/o melanoma: No, but had BCC  s/p Mohs       Family h/o melanoma: No         Mental Health Assessment:       h/o Depression: No       h/o Psychiatric Illness: No       h/o Suicidal Attempt(s): No    COVID Status:  Vaccination Up To Date: No, due for next dose  H/o COVID Infection: No     Review Of Systems:   A comprehensive review of systems was obtained and negative, except as noted in the HPI or PMH.    Past Medical History:   History was taken from the patient as noted below.  Past Medical History:   Diagnosis Date     History of basal cell carcinoma     s/p Mohs surgery in        Past Social History:   No past surgical history on file.  Personal or family history of anesthesia problems: No    Family History:   Family History   Problem Relation Age of Onset     Breast Cancer Mother 80     Blood Disease Father 70        some kind of blood dysplasia, passed from old age in 90s     Diverticulitis Brother           Specific Family History in First Degree Relatives:       FH of Kidney Dz: No FH of Diabetes: No       FH of Hypertension: Yes   FH of CAD: No       FH of Cancer: No  FH of Kidney Cancer: No    Personal History:   Social History     Socioeconomic History     Marital status:      Spouse name: Not on file     Number of children: 3     Years of education: Not on file     Highest education level: Not on file   Occupational History     Not on file   Tobacco Use     Smoking status: Some Days     Types: Cigarettes, Cigars     Start date:      Last attempt to quit:      Years since quittin.8     Smokeless tobacco: Never     Tobacco comments:     Smoked cigarettes socially since college, last in . Currently smokes cigars about once a month.    Substance and Sexual Activity     Alcohol use: Yes     Alcohol/week: 5.0 standard drinks     Types: 5 Standard drinks or equivalent per week     Comment: about 5 drinks per week, mix of wine/beer/others     Drug use: Never     Sexual activity: Not on file   Other Topics Concern      Not on file   Social History Narrative    Lives at home with daughters and wife. Also has a son who is currently living in a group home. Runs for exercise, ran 10 miles in June, but has been running less. Amount of exercise fluctuates throughout the year.      Social Determinants of Health     Financial Resource Strain: Not on file   Food Insecurity: Not on file   Transportation Needs: Not on file   Physical Activity: Not on file   Stress: Not on file   Social Connections: Not on file   Intimate Partner Violence: Not on file   Housing Stability: Not on file          Specific Social History:       Health Insurance Status: Yes       Employment Status: Retired  Occupation:  for 30 years                       Living Arrangements: lives with their spouse and daughters       Social Support: Yes       Presence of increased risk for disease transmission behaviors as defined by [facility] guidelines: No        Allergies:  No Known Allergies    Medications:  Current Outpatient Medications   Medication Sig     albuterol (PROAIR HFA/PROVENTIL HFA/VENTOLIN HFA) 108 (90 Base) MCG/ACT inhaler Inhale 1-2 puffs into the lungs     fexofenadine-pseudoePHEDrine (ALLEGRA-D)  MG 12 hr tablet Take 1 tablet by mouth 2 times daily     fluticasone (FLONASE) 50 MCG/ACT nasal spray 2 sprays     rosuvastatin (CRESTOR) 10 MG tablet Take 10 mg by mouth daily     sildenafil (VIAGRA) 100 MG tablet TAKE 1/2 TO 1 (ONE-HALF TO ONE) TABLET BY MOUTH ONCE DAILY AS NEEDED     tadalafil (CIALIS) 20 MG tablet TAKE 1 TABLET BY MOUTH AS NEEDED FOR ERECTILE DYSFUNCTION     No current facility-administered medications for this visit.     Facility-Administered Medications Ordered in Other Visits   Medication     sodium chloride (PF) 0.9% PF flush 5 mL     There are no discontinued medications.      Vitals:  Vital Signs 11/4/2022 11/4/2022 11/4/2022   Systolic 143 145 139   Diastolic 98 94 92   Pulse 65 - -   Temperature - - -   Respirations - - -   Weight  "(LB) 185 lb - -   Height 5' 8\" - -   BMI (Calculated) 28.13 - -   Pain Score - - -   O2 96 - -       Exam:   GENERAL APPEARANCE: alert and no distress  EYES: PERRL  HENT: mouth without ulcers or lesions  NECK: supple, no adenopathy  LYMPHATICS: no cervical or supraclavicular nodes  RESP: lungs clear to auscultation - no rales, rhonchi or wheezes  CV: regular rhythm, normal rate, no rub, no murmur  EDEMA: no LE edema bilaterally  ABDOMEN: soft, nondistended, nontender, bowel sounds normal  MS: extremities normal - no gross deformities noted, no evidence of inflammation in joints, no muscle tenderness  SKIN: no rash  NEURO: normal strength and tone, sensory exam grossly normal, mentation intact and speech normal  PSYCH: mentation appears normal and affect normal/bright    Results:   Labs and imaging were ordered for this visit and reviewed by me.  Recent Results (from the past 336 hour(s))   Protein  random urine    Collection Time: 11/04/22  8:14 AM   Result Value Ref Range    Total Protein Urine mg/dL <6.0 mg/dL    Total Protein UR MG/MG CR      Creatinine Urine mg/dL 56.0 mg/dL   Routine UA with microscopic    Collection Time: 11/04/22  8:14 AM   Result Value Ref Range    Color Urine Yellow Colorless, Straw, Light Yellow, Yellow    Appearance Urine Clear Clear    Glucose Urine Negative Negative mg/dL    Bilirubin Urine Negative Negative    Ketones Urine Negative Negative mg/dL    Specific Gravity Urine 1.015 1.003 - 1.035    Blood Urine Negative Negative    pH Urine 6.5 5.0 - 7.0    Protein Albumin Urine Negative Negative mg/dL    Urobilinogen Urine Normal Normal, 2.0 mg/dL    Nitrite Urine Negative Negative    Leukocyte Esterase Urine Negative Negative    RBC Urine 1 <=2 /HPF    WBC Urine <1 <=5 /HPF   CBC with platelets    Collection Time: 11/04/22  8:14 AM   Result Value Ref Range    WBC Count 5.9 4.0 - 11.0 10e3/uL    RBC Count 5.18 4.40 - 5.90 10e6/uL    Hemoglobin 15.3 13.3 - 17.7 g/dL    Hematocrit 45.6 40.0 " - 53.0 %    MCV 88 78 - 100 fL    MCH 29.5 26.5 - 33.0 pg    MCHC 33.6 31.5 - 36.5 g/dL    RDW 12.9 10.0 - 15.0 %    Platelet Count 213 150 - 450 10e3/uL   Partial thromboplastin time    Collection Time: 11/04/22  8:14 AM   Result Value Ref Range    aPTT 29 22 - 38 Seconds   INR    Collection Time: 11/04/22  8:14 AM   Result Value Ref Range    INR 0.99 0.85 - 1.15   Hemoglobin A1c    Collection Time: 11/04/22  8:14 AM   Result Value Ref Range    Hemoglobin A1C 5.6 <5.7 %   Phosphorus    Collection Time: 11/04/22  8:14 AM   Result Value Ref Range    Phosphorus 3.2 2.5 - 4.5 mg/dL   Comprehensive metabolic panel    Collection Time: 11/04/22  8:14 AM   Result Value Ref Range    Sodium 139 136 - 145 mmol/L    Potassium 3.8 3.4 - 5.3 mmol/L    Chloride 103 98 - 107 mmol/L    Carbon Dioxide (CO2) 27 22 - 29 mmol/L    Anion Gap 9 7 - 15 mmol/L    Urea Nitrogen 16.3 8.0 - 23.0 mg/dL    Creatinine 1.19 (H) 0.67 - 1.17 mg/dL    Calcium 9.7 8.8 - 10.2 mg/dL    Glucose 99 70 - 99 mg/dL    Alkaline Phosphatase 56 40 - 129 U/L    AST 27 10 - 50 U/L    ALT 19 10 - 50 U/L    Protein Total 7.0 6.4 - 8.3 g/dL    Albumin 4.5 3.5 - 5.2 g/dL    Bilirubin Total 0.5 <=1.2 mg/dL    GFR Estimate 70 >60 mL/min/1.73m2   Adult Type and Screen    Collection Time: 11/04/22  8:14 AM   Result Value Ref Range    ABO/RH(D) O POS     Antibody Screen Negative Negative    SPECIMEN EXPIRATION DATE [id number]    ABO and Rh 2nd type and screen required    Collection Time: 11/04/22  8:42 AM   Result Value Ref Range    ABO/RH(D) O POS     SPECIMEN EXPIRATION DATE [id number]

## 2022-11-04 NOTE — PROGRESS NOTES
[facility] Solid Organ Transplant  Outpatient MNT: Kidney Donor Evaluation    Current BMI: 28.13 (HT 68 in,  lbs/84 kg)  BMI is within recommendation of <30 for kidney donation    8 Year Estimated Risk of T2DM  </= 3%     Time Spent: 15 minutes  Visit Type: Initial  Referring Physician: Manuel   Pt accompanied by: self     Nutrition Assessment  Pt reports periods of time where he 'dials in' his diet and focuses on healthy eating, achieves a goal weight, then goes back to baseline. He has done some periods of intermittent fasting.    He reports some high BP over the last 5 years during various doctor appointments.    Vitamins, Supplements, Pertinent Meds: MVI, occasional beet powder if he exercises   Herbal Medicines/Supplements: none    Weight hx: 165-185 lbs; reports this is his typical weight range that varies based on the season    Food Security: any concerns about having enough money to buy food or access to grocery stores? No     Diet Recall  Breakfast Toast + 1-2 eggs or oatmeal   Lunch Turkey s/w    Dinner [place], rice, salad; tacos    Snacks Almonds, chips    Beverages Coffee, water, occasional milk    Alcohol 4-6 drinks/week    Dining out 2-3x/week      Physical Activity  Sporadic- running, walking on incline, weights (0-7 days/week)  Depends on time of year - does a race in the spring  Some fishing (seasonal)    Labs  Recent Labs   Lab Test 11/04/22  0814   CHOL 185   HDL 57   LDL 94   TRIG 168*       FBG = 99; 86 (10/4/22)  A1c = 5.6  BP = high     Prediction of Incident Diabetes Mellitus in Middle-aged Adults: The [place] Offspring Study  Aldo Seran MD; James B. Meigs, MD, MPH; Arianne Glasgow, PhD; Dorys Shahid MD, MPH; Gerardo Carter MD; Ravi Mukherjee Sr,   PhD  Pt's estimated risk for T2DM (per Table 6 above)  Pt received points for the following criteria: BMI>25, high TG, HTN  Total points: 7  8-Year estimated risk of T2DM: </= 3%    Nutrition Diagnosis  No  nutrition diagnosis identified at this time.    Nutrition Intervention  Nutrition education provided:  Reviewed overall healthy diet guidelines for pre and post kidney donation. Discussed maintenance of a healthy weight and Na+ intake <3000 mg/day (<2000 mg/day if HTN).    Avoid the following post op d/t unknown effects on the organs:  - Herbal, Chinese, holistic, chiropractic, natural, alternative medicines and supplements  - Detoxes and cleanses  - Weight loss pills  - Protein powders or other products with extracts or herbs (ie green tea extract)    Patient Understanding: Pt verbalized understanding of education provided.  Expected Engagement: Good  Follow-Up Plans: PRN     Nutrition Goals  No nutrition goals identified at this time     Shayy Lizama, RD, LD, CCTD

## 2022-11-04 NOTE — PROGRESS NOTES
[facility]  Consult Note     Medical record number: [id number]  YOB: 1960,     Date of Visit:  11/04/2022  Consult requested by the patient for evaluation of kidney donation candidacy.    61 yo male planning to donate to cousin  On crestor for cholesterol  Has had borderline high BPs previously  Last PCP visit was 136/95  This morning 145/95  WIll need CABP monitoring and possible low dose anti htn therapy  Refer back to PMD for this  Pt had reported respiratory wheeze to PMD  Got PFTs  Suggestive of small airway dx vs left heart strain  May need Echo to rule out LVH, refer to PMD for this    Will suspend eval for now until above issues resolved    Risks of the surgical procedure including but not limited to the rare risk of mortality discussed in detail. Patient verbalized good understanding and had several pertinent questions which were answered satisfactorily.         Assessment and Recommendations: Mr. Hinojosa appears to be a fair candidate for kidney donation at this point in the evaluation. The following issues will need to be addressed prior to formal review:    Iohexol ordered for today for assessment of adequate kidney function for donation. Will be reviewed when resulted  Donor labs ordered for today and reviewed to include: CBC, CMP, Mg, PO4, hemoglobin A1c, lipid panel, blood type x 2, hemoglobin A1c, UA with microscopic, urine culture, urine protein/cr, INR/PTT, Quantiferon gold, hepatitis C (antibody), hepatitis B panel, HIV, treponemia, West Nile (antibody panel), CMV (antibody panel), EBV (antibody panel), pregnancy screen (for females of childbearing age), PSA (males >50yrs), HLA tissue typing, buccal swab for eplet typing  Social work consult ordered  Dietician consult ordered  Transplant nephrology consult ordered  Transplant coordinator consult ordered  JOHN (independent living donor advovate) consult ordered  EKG ordered for today and will be reviewed when  resulted. Suitable to proceed today with this testing today:  No   Chest x-ray ordered for today and will be reviewed when resulted. Suitable to proceed with this testing today:  No   CT angio of abdomen and pelvis for anatomical assessment. Images and report to be reviewed when resulted.  Suitable to proceed with this testing today:  No  After review of the above, additional testing/concerns include:  To PMD    The majority of our visit today was spent in counseling regarding the medical and surgical risks of kidney donation, the typical triston-and post-operative experience and recovery/return to work pattern.  We also talked about post-op visits and longer term health care maintenance, as well as the implications of having one remaining kidney. This discussion included, but was not limited to rates of complications such as bleeding, infection, need for transfusion, reoperation, other organ injury, future bowel obstruction, incisional hernia, port site pain, varicocele, venous thrombosis, pulmonary embolism, renal failure, and death (3 per 10,000). At the conclusion of the visit, all questions had been answered and Mr. Hinojosa's candidacy for donation will be reviewed at our Multidisciplinary Donor Selection Committee.     60 min spent on the date of the encounter in chart review, patient visit,  documentation and/or discussion with other providers about the issues documented above.    .    ---------------------------------------------------------------------------------------------------    HPI: Mr. Hinojosa wishes to donate a kidney to cousin.           NO  Personal history of cancer    []         Comment:     Personal history of kidney problems   []         Comment:   Personal history of diabetes    []         Comment:                  Bladder emptying problems (prostate, urinary retention) []         Comment:   Neck or Back problems:     []         Comment:   Constipation      []         Comment:       Frequent NSAID  use:         []         Comment:       Other:        []         Comment:                No past medical history on file.  No past surgical history on file.  No family history on file.  Social History     Socioeconomic History     Marital status:      Spouse name: Not on file     Number of children: Not on file     Years of education: Not on file     Highest education level: Not on file   Occupational History     Not on file   Tobacco Use     Smoking status: Not on file     Smokeless tobacco: Not on file   Substance and Sexual Activity     Alcohol use: Not on file     Drug use: Not on file     Sexual activity: Not on file   Other Topics Concern     Not on file   Social History Narrative     Not on file     Social Determinants of Health     Financial Resource Strain: Not on file   Food Insecurity: Not on file   Transportation Needs: Not on file   Physical Activity: Not on file   Stress: Not on file   Social Connections: Not on file   Intimate Partner Violence: Not on file   Housing Stability: Not on file       ROS:   CONSTITUTIONAL:  No fevers or chills  EYES: negative for icterus  ENT:  negative for hearing loss, tinnitus and sore throat  RESPIRATORY:  negative for cough, sputum, dyspnea  CARDIOVASCULAR:  negative for chest pain  GASTROINTESTINAL:  negative for nausea, vomiting, diarrhea or constipation  GENITOURINARY:  negative for incontinence, dysuria, bladder emptying problems  HEME:  No easy bruising  INTEGUMENT:  negative for rash and pruritus  NEURO:  Negative for headache, seizure disorder    Allergies:   No Known Allergies    Medications:  Prescription Medications as of 11/4/2022       Rx Number Disp Refills Start End Last Dispensed Date Next Fill Date Owning Pharmacy    albuterol (PROAIR HFA/PROVENTIL HFA/VENTOLIN HFA) 108 (90 Base) MCG/ACT inhaler    10/4/2022        Sig: Inhale 1-2 puffs into the lungs    Class: Historical    Route: Inhalation    fexofenadine-pseudoePHEDrine (ALLEGRA-D)  MG  12 hr tablet    10/4/2022        Sig: Take 1 tablet by mouth 2 times daily    Class: Historical    Route: Oral    fluticasone (FLONASE) 50 MCG/ACT nasal spray    10/4/2022        Si sprays    Class: Historical    rosuvastatin (CRESTOR) 10 MG tablet    10/16/2022 10/16/2023       Sig: Take 10 mg by mouth daily    Class: Historical    Route: Oral      Clinic-Administered Medications as of 2022       Dose Frequency Start End    iohexol (OMNIPAQUE) 350 MG/ML injectable solution 4 mL (Completed) 4 mL ONCE 2022    Route: Intravenous    sodium chloride (PF) 0.9% PF flush 5 mL 5 mL ONCE 2022     Admin Instructions: Post infusion line flush.    Route: Intracatheter        Exam:   Temp:  [97.4  F (36.3  C)] 97.4  F (36.3  C)  Pulse:  [56-60] 60  Resp:  [16] 16  BP: (145-154)/() 145/95  SpO2:  [99 %] 99 %  Appearance: in no apparent distress.   Skin: normal  Head and Neck: Normal, no rashes or jaundice  Respiratory: normal respiratory excursions, no audible wheeze  Cardiovascular: RRR  Abdomen: rounded, No surgical scars   Extremeties: Edema, none  Neuro: without deficit       Labs:   ABO: O  Chemistries:   Recent Labs   Lab Test 22  0814 20  1004 20  0846     --   --    POTASSIUM 3.8  --   --    CHLORIDE 103  --   --    CO2 27  --   --    ANIONGAP 9  --   --    GLC 99  --  96   BUN 16.3  --   --    CR 1.19* 1.36* 1.45*   NELSON 9.7  --   --        Urine Studies:   Recent Labs   Lab Test 22  0814 20  0855   COLOR Yellow Yellow   APPEARANCE Clear Clear   URINEGLC Negative Negative   URINEBILI Negative Negative   URINEKETONE Negative Negative   SG 1.015 1.020   UBLD Negative Negative   URINEPH 6.5 7.0   PROTEIN Negative Negative   NITRITE Negative Negative   LEUKEST Negative Negative   RBCU 1  --    WBCU <1  --      Recent Labs   Lab Test 20  0854   UTPG 0.07   MICROL 7       Hematology:      Recent Labs   Lab Test 22  0814   WBC 5.9   RBC 5.18   HGB  15.3   HCT 45.6   MCV 88   MCH 29.5   MCHC 33.6   RDW 12.9          Coags:   Recent Labs   Lab Test 11/04/22  0814   INR 0.99       Lipid Profile:   No results found for: CHOL, TRIG, HDL, LDL, CHOLHDLRATIO, NHDL    Virals:  CMV and EBV pending.   No lab results found.   No results found for: HCVAB, HQTG, HCGENO, HCPCR, HQTRNA, HEPRNA, CRYOG    No results found for: HCVAB, HBSAB, HBSAG

## 2022-11-04 NOTE — PROGRESS NOTES
Donor Iohexol test    Gerardo Hinojosa presents today to [facility] for a Donor Iohexol test.    Progress note:  ID verified by name and .     The following information was verified with the patient:  Female Patients is there any possibility of being pregnant NA  Is there a history of allergy (skin rash, swelling, ect) to:   A.  Iodine (except skin reactions to betadine): No   B. Intravenous radio-contrast agents: No   C. Seafood No     present during visit today: Not Applicable.    R.N. provided patient with educational handout regarding timed test. Yes    PIV placed in left arm and Iohexol administered over 2 minutes.  Positive blood return verified before and after injection. PIV removed.  20 gauge PIV placed in right arm prior to [facility] appt in 2nd floor lab  for blood draws and CT this afternoon.    Medication administered:Iohexol (Omnipaque 350mg iodine/ml concentration) 4 mls.    Start time: 07  Stop time: 07    Evaluation nurse in transplant to draw labs at 2 and 4 hours post iohexol administration.  Patient given a slip with the times to get labs drawn and verbalized understanding of the plan.    Patient tolerated the procedure:  Yes  After the infusion patient was discharged to the next appointment.    Fatou Chen RN

## 2022-11-04 NOTE — LETTER
11/4/2022         RE: Gearrdo Hinojosa  [address] [address]        Dear Colleague,    Thank you for referring your patient, Gerardo Hinojosa, to the [facility]. Please see a copy of my visit note below.    [facility]  Consult Note     Medical record number: [id number]  YOB: 1960,     Date of Visit:  11/04/2022  Consult requested by the patient for evaluation of kidney donation candidacy.    63 yo male planning to donate to cousin  On crestor for cholesterol  Has had borderline high BPs previously  Last PCP visit was 136/95  This morning 145/95  WIll need CABP monitoring and possible low dose anti htn therapy  Refer back to PMD for this  Pt had reported respiratory wheeze to PMD  Got PFTs  Suggestive of small airway dx vs left heart strain  May need Echo to rule out LVH, refer to PMD for this    Will suspend eval for now until above issues resolved    Risks of the surgical procedure including but not limited to the rare risk of mortality discussed in detail. Patient verbalized good understanding and had several pertinent questions which were answered satisfactorily.         Assessment and Recommendations: Mr. Hinojosa appears to be a fair candidate for kidney donation at this point in the evaluation. The following issues will need to be addressed prior to formal review:    Iohexol ordered for today for assessment of adequate kidney function for donation. Will be reviewed when resulted  Donor labs ordered for today and reviewed to include: CBC, CMP, Mg, PO4, hemoglobin A1c, lipid panel, blood type x 2, hemoglobin A1c, UA with microscopic, urine culture, urine protein/cr, INR/PTT, Quantiferon gold, hepatitis C (antibody), hepatitis B panel, HIV, treponemia, West Nile (antibody panel), CMV (antibody panel), EBV (antibody panel), pregnancy screen (for females of childbearing age), PSA (males >50yrs), HLA tissue typing, buccal swab for  eplet typing  Social work consult ordered  Dietician consult ordered  Transplant nephrology consult ordered  Transplant coordinator consult ordered  JOHN (independent living donor advovate) consult ordered  EKG ordered for today and will be reviewed when resulted. Suitable to proceed today with this testing today:  No   Chest x-ray ordered for today and will be reviewed when resulted. Suitable to proceed with this testing today:  No   CT angio of abdomen and pelvis for anatomical assessment. Images and report to be reviewed when resulted.  Suitable to proceed with this testing today:  No  After review of the above, additional testing/concerns include:  To PMD    The majority of our visit today was spent in counseling regarding the medical and surgical risks of kidney donation, the typical triston-and post-operative experience and recovery/return to work pattern.  We also talked about post-op visits and longer term health care maintenance, as well as the implications of having one remaining kidney. This discussion included, but was not limited to rates of complications such as bleeding, infection, need for transfusion, reoperation, other organ injury, future bowel obstruction, incisional hernia, port site pain, varicocele, venous thrombosis, pulmonary embolism, renal failure, and death (3 per 10,000). At the conclusion of the visit, all questions had been answered and Mr. Hinojosa's candidacy for donation will be reviewed at our Multidisciplinary Donor Selection Committee.     60 min spent on the date of the encounter in chart review, patient visit,  documentation and/or discussion with other providers about the issues documented above.    .    ---------------------------------------------------------------------------------------------------    HPI: Mr. Hinojosa wishes to donate a kidney to cousin.           NO  Personal history of cancer    []         Comment:     Personal history of kidney problems   []         Comment:    Personal history of diabetes    []         Comment:                  Bladder emptying problems (prostate, urinary retention) []         Comment:   Neck or Back problems:     []         Comment:   Constipation      []         Comment:       Frequent NSAID use:         []         Comment:       Other:        []         Comment:                No past medical history on file.  No past surgical history on file.  No family history on file.  Social History     Socioeconomic History     Marital status:      Spouse name: Not on file     Number of children: Not on file     Years of education: Not on file     Highest education level: Not on file   Occupational History     Not on file   Tobacco Use     Smoking status: Not on file     Smokeless tobacco: Not on file   Substance and Sexual Activity     Alcohol use: Not on file     Drug use: Not on file     Sexual activity: Not on file   Other Topics Concern     Not on file   Social History Narrative     Not on file     Social Determinants of Health     Financial Resource Strain: Not on file   Food Insecurity: Not on file   Transportation Needs: Not on file   Physical Activity: Not on file   Stress: Not on file   Social Connections: Not on file   Intimate Partner Violence: Not on file   Housing Stability: Not on file       ROS:   CONSTITUTIONAL:  No fevers or chills  EYES: negative for icterus  ENT:  negative for hearing loss, tinnitus and sore throat  RESPIRATORY:  negative for cough, sputum, dyspnea  CARDIOVASCULAR:  negative for chest pain  GASTROINTESTINAL:  negative for nausea, vomiting, diarrhea or constipation  GENITOURINARY:  negative for incontinence, dysuria, bladder emptying problems  HEME:  No easy bruising  INTEGUMENT:  negative for rash and pruritus  NEURO:  Negative for headache, seizure disorder    Allergies:   No Known Allergies    Medications:  Prescription Medications as of 11/4/2022       Rx Number Disp Refills Start End Last Dispensed Date Next Fill  Date Owning Pharmacy    albuterol (PROAIR HFA/PROVENTIL HFA/VENTOLIN HFA) 108 (90 Base) MCG/ACT inhaler    10/4/2022        Sig: Inhale 1-2 puffs into the lungs    Class: Historical    Route: Inhalation    fexofenadine-pseudoePHEDrine (ALLEGRA-D)  MG 12 hr tablet    10/4/2022        Sig: Take 1 tablet by mouth 2 times daily    Class: Historical    Route: Oral    fluticasone (FLONASE) 50 MCG/ACT nasal spray    10/4/2022        Si sprays    Class: Historical    rosuvastatin (CRESTOR) 10 MG tablet    10/16/2022 10/16/2023       Sig: Take 10 mg by mouth daily    Class: Historical    Route: Oral      Clinic-Administered Medications as of 2022       Dose Frequency Start End    iohexol (OMNIPAQUE) 350 MG/ML injectable solution 4 mL (Completed) 4 mL ONCE 2022    Route: Intravenous    sodium chloride (PF) 0.9% PF flush 5 mL 5 mL ONCE 2022     Admin Instructions: Post infusion line flush.    Route: Intracatheter        Exam:   Temp:  [97.4  F (36.3  C)] 97.4  F (36.3  C)  Pulse:  [56-60] 60  Resp:  [16] 16  BP: (145-154)/() 145/95  SpO2:  [99 %] 99 %  Appearance: in no apparent distress.   Skin: normal  Head and Neck: Normal, no rashes or jaundice  Respiratory: normal respiratory excursions, no audible wheeze  Cardiovascular: RRR  Abdomen: rounded, No surgical scars   Extremeties: Edema, none  Neuro: without deficit       Labs:   ABO: O  Chemistries:   Recent Labs   Lab Test 22  0814 20  1004 20  0846     --   --    POTASSIUM 3.8  --   --    CHLORIDE 103  --   --    CO2 27  --   --    ANIONGAP 9  --   --    GLC 99  --  96   BUN 16.3  --   --    CR 1.19* 1.36* 1.45*   NELSON 9.7  --   --        Urine Studies:   Recent Labs   Lab Test 22  0814 20  0855   COLOR Yellow Yellow   APPEARANCE Clear Clear   URINEGLC Negative Negative   URINEBILI Negative Negative   URINEKETONE Negative Negative   SG 1.015 1.020   UBLD Negative Negative   URINEPH 6.5 7.0    PROTEIN Negative Negative   NITRITE Negative Negative   LEUKEST Negative Negative   RBCU 1  --    WBCU <1  --      Recent Labs   Lab Test 02/05/20  0854   UTPG 0.07   MICROL 7       Hematology:      Recent Labs   Lab Test 11/04/22  0814   WBC 5.9   RBC 5.18   HGB 15.3   HCT 45.6   MCV 88   MCH 29.5   MCHC 33.6   RDW 12.9          Coags:   Recent Labs   Lab Test 11/04/22  0814   INR 0.99       Lipid Profile:   No results found for: CHOL, TRIG, HDL, LDL, CHOLHDLRATIO, NHDL    Virals:  CMV and EBV pending.   No lab results found.   No results found for: HCVAB, HQTG, HCGENO, HCPCR, HQTRNA, HEPRNA, CRYOG    No results found for: HCVAB, HBSAB, HBSAG          Again, thank you for allowing me to participate in the care of your patient.        Sincerely,        Bladimir Jackson MD

## 2022-11-04 NOTE — PATIENT INSTRUCTIONS
Dear Gerardo Hinojosa    Thank you for choosing [facility] ([facility]) for your follow up care.  The following information is a summary of our appointment as well as important reminders.      We look forward in seeing you on your next appointment here at [facility] ([facility]).  Please don t hesitate to call us at [phone] to reschedule any of your appointments or to speak with one of the [facility] registered nurses.  It was a pleasure taking care of you today.    Sincerely,    [facility]  [facility]  [address] [place] [address]  [id number]  Phone:  [phone]

## 2022-11-04 NOTE — LETTER
2022         RE: Gerardo Hinojosa  [address] [address]        Dear Colleague,    Thank you for referring your patient, Gerardo Hinojosa, to the [facility]. Please see a copy of my visit note below.    Donor Iohexol test    Gerardo Hinojosa presents today to [facility] for a Donor Iohexol test.    Progress note:  ID verified by name and .     The following information was verified with the patient:  Female Patients is there any possibility of being pregnant NA  Is there a history of allergy (skin rash, swelling, ect) to:   A.  Iodine (except skin reactions to betadine): No   B. Intravenous radio-contrast agents: No   C. Seafood No     present during visit today: Not Applicable.    R.N. provided patient with educational handout regarding timed test. Yes    PIV placed in left arm and Iohexol administered over 2 minutes.  Positive blood return verified before and after injection. PIV removed.  20 gauge PIV placed in right arm prior to [facility] appt in 2nd floor lab  for blood draws and CT this afternoon.    Medication administered:Iohexol (Omnipaque 350mg iodine/ml concentration) 4 mls.    Start time: 0739  Stop time: 07    Evaluation nurse in transplant to draw labs at 2 and 4 hours post iohexol administration.  Patient given a slip with the times to get labs drawn and verbalized understanding of the plan.    Patient tolerated the procedure:  Yes  After the infusion patient was discharged to the next appointment.    Fatou Chen RN

## 2022-11-06 LAB
GAMMA INTERFERON BACKGROUND BLD IA-ACNC: 0.04 IU/ML
M TB IFN-G BLD-IMP: NEGATIVE
M TB IFN-G CD4+ BCKGRND COR BLD-ACNC: 9.96 IU/ML
MITOGEN IGNF BCKGRD COR BLD-ACNC: -0.01 IU/ML
MITOGEN IGNF BCKGRD COR BLD-ACNC: 0 IU/ML
QUANTIFERON MITOGEN: 10 IU/ML
QUANTIFERON NIL TUBE: 0.04 IU/ML
QUANTIFERON TB1 TUBE: 0.04 IU/ML
QUANTIFERON TB2 TUBE: 0.03

## 2022-11-08 LAB
BSA: 2.02 M2
IOHEXOL CL UR+SERPL-VRATE: 4.78 MG/DL
IOHEXOL CL UR+SERPL-VRATE: 7.73 MG/DL
IOHEXOL CL UR+SERPL-VRATE: 72 /1.73 M2
IOHEXOL CL UR+SERPL-VRATE: 85 ML/MIN
WNV IGG SER IA-ACNC: 0.1 IV
WNV IGM SER IA-ACNC: 0.09 IV

## 2022-11-09 ENCOUNTER — COMMITTEE REVIEW (OUTPATIENT)
Dept: TRANSPLANT | Facility: CLINIC | Age: 62
End: 2022-11-09

## 2022-11-09 ENCOUNTER — TELEPHONE (OUTPATIENT)
Dept: TRANSPLANT | Facility: CLINIC | Age: 62
End: 2022-11-09

## 2022-11-09 NOTE — TELEPHONE ENCOUNTER
Writer calling Gerardo to discuss outcome of committee.   He is on a statin and will be starting blood pressure medications today. Early January we will plan for a 24 hour blood pressure monitor. In the meantime he will complete colonoscopy, echo, dermatology, and opthalmology. In January we will have him complete CT abdomen, chest xray, and social work visit. Instructed Gerardo to call with any questions in the meantime. Gerardo verbalized understanding and is in agreement with plan.

## 2022-11-09 NOTE — COMMITTEE REVIEW
Living Donor Committee Review Note Evaluation Date: 11/4/2022  Committee Review Date: 11/9/2022    Donor being evaluated for: Kidney    Transplant Phase: Evaluation  Transplant Status: Active    Transplant Coordinator: Shelley Shahid  Transplant Surgeon:       Committee Review Members:  Independent Living Donor Advocate Jannie Ontiveros, [facility], Polly Briggs, [facility]   Nephrology Juan Childress MD   Nutrition Shayy Lizama, RD   Pharmacist Saeed Eid, [facility]   Transplant Carol Luana Diaz, RN, Shelley Shahid, RN, Jennifer Madden, RN, Coral Rodríguez LPN, Jil Jenkins, FRANKIE, Susy Whittington, FRANKIE, Jessica Cunningham, RN   Transplant Surgery Kasia Camacho MD, MD       Transplant Eligibility:     Committee Review Decision: Needs Re-presentation    Relative Contraindications:     Absolute Contraindications:     Committee Chair Kasia Camacho MD verbally attested to the committee's decision.    Committee Discussion Details:  Had high blood pressures on evaluation. Afternoon portion cancelled. After he meets with PCP and starts blood pressure medication and is stable for a few months we can re-evaluate starting with a 24 hour BP monitor. Then he will also need:  1. Colonoscopy  2. Derm clearance - history of BCC  3. Echo  4. CT Scan Abdomen  5. Chest Xray  6. Social work visit  7. Discuss statins with PCP as well due to hyperlipidemia

## 2022-11-09 NOTE — TELEPHONE ENCOUNTER
Patient returned call says his doctor says to put off tests for a month or so due to his blood pressure.Will call his coordinator when he can reschedule

## 2022-11-10 LAB
A*: NORMAL
A*LOCUS SEROLOGIC EQUIVALENT: 2
A*LOCUS: NORMAL
A*SEROLOGIC EQUIVALENT: 31
ABTEST METHOD: NORMAL
B*: NORMAL
B*LOCUS SEROLOGIC EQUIVALENT: 57
B*LOCUS: NORMAL
B*SEROLOGIC EQUIVALENT: 57
BW-1: NORMAL
C*: NORMAL
C*LOCUS SEROLOGIC EQUIVALENT: 6
C*LOCUS: NORMAL
C*SEROLOGIC EQUIVALENT: 7
DPA1*: NORMAL
DPA1*LOCUS: NORMAL
DPB1*: NORMAL
DPB1*LOCUS: NORMAL
DQA1*: NORMAL
DQA1*LOCUS: NORMAL
DQB1*: NORMAL
DQB1*LOCUS SEROLOGIC EQUIVALENT: 7
DQB1*LOCUS: NORMAL
DQB1*SEROLOGIC EQUIVALENT: 9
DRB1*: NORMAL
DRB1*LOCUS SEROLOGIC EQUIVALENT: 7
DRB1*LOCUS: NORMAL
DRB1*SEROLOGIC EQUIVALENT: 13
DRB3*LOCUS SEROLOGIC EQUIVALENT: 52
DRB3*LOCUS: NORMAL
DRB4*: NORMAL
DRB4*SEROLOGIC EQUIVALENT: NORMAL
DRSSO TEST METHOD: NORMAL

## 2022-11-19 ENCOUNTER — HEALTH MAINTENANCE LETTER (OUTPATIENT)
Age: 62
End: 2022-11-19

## 2023-01-03 ENCOUNTER — TELEPHONE (OUTPATIENT)
Dept: TRANSPLANT | Facility: CLINIC | Age: 63
End: 2023-01-03

## 2023-11-19 ENCOUNTER — HEALTH MAINTENANCE LETTER (OUTPATIENT)
Age: 63
End: 2023-11-19

## 2024-12-29 ENCOUNTER — HEALTH MAINTENANCE LETTER (OUTPATIENT)
Age: 64
End: 2024-12-29